# Patient Record
Sex: FEMALE | Race: BLACK OR AFRICAN AMERICAN | Employment: UNEMPLOYED | URBAN - METROPOLITAN AREA
[De-identification: names, ages, dates, MRNs, and addresses within clinical notes are randomized per-mention and may not be internally consistent; named-entity substitution may affect disease eponyms.]

---

## 2017-03-31 PROBLEM — M54.2 CERVICALGIA: Status: ACTIVE | Noted: 2017-03-31

## 2017-06-08 ENCOUNTER — ANESTHESIA EVENT (OUTPATIENT)
Dept: ENDOSCOPY | Age: 52
End: 2017-06-08
Payer: COMMERCIAL

## 2017-06-08 RX ORDER — SODIUM CHLORIDE, SODIUM LACTATE, POTASSIUM CHLORIDE, CALCIUM CHLORIDE 600; 310; 30; 20 MG/100ML; MG/100ML; MG/100ML; MG/100ML
100 INJECTION, SOLUTION INTRAVENOUS CONTINUOUS
Status: CANCELLED | OUTPATIENT
Start: 2017-06-08

## 2017-06-08 RX ORDER — SODIUM CHLORIDE 0.9 % (FLUSH) 0.9 %
5-10 SYRINGE (ML) INJECTION AS NEEDED
Status: CANCELLED | OUTPATIENT
Start: 2017-06-08

## 2017-06-09 ENCOUNTER — ANESTHESIA (OUTPATIENT)
Dept: ENDOSCOPY | Age: 52
End: 2017-06-09
Payer: COMMERCIAL

## 2017-06-09 ENCOUNTER — HOSPITAL ENCOUNTER (OUTPATIENT)
Age: 52
Setting detail: OUTPATIENT SURGERY
Discharge: HOME | End: 2017-06-09
Attending: SURGERY | Admitting: SURGERY
Payer: COMMERCIAL

## 2017-06-09 VITALS
HEART RATE: 70 BPM | SYSTOLIC BLOOD PRESSURE: 141 MMHG | TEMPERATURE: 96.8 F | HEIGHT: 65 IN | DIASTOLIC BLOOD PRESSURE: 77 MMHG | RESPIRATION RATE: 18 BRPM | WEIGHT: 196 LBS | BODY MASS INDEX: 32.65 KG/M2 | OXYGEN SATURATION: 66 %

## 2017-06-09 LAB — GLUCOSE BLD STRIP.AUTO-MCNC: 115 MG/DL (ref 65–100)

## 2017-06-09 PROCEDURE — 77030011640 HC PAD GRND REM COVD -A: Performed by: SURGERY

## 2017-06-09 PROCEDURE — 76040000025: Performed by: SURGERY

## 2017-06-09 PROCEDURE — 76060000032 HC ANESTHESIA 0.5 TO 1 HR: Performed by: SURGERY

## 2017-06-09 PROCEDURE — 77030009426 HC FCPS BIOP ENDOSC BSC -B: Performed by: SURGERY

## 2017-06-09 PROCEDURE — 74011000250 HC RX REV CODE- 250: Performed by: ANESTHESIOLOGY

## 2017-06-09 PROCEDURE — 82962 GLUCOSE BLOOD TEST: CPT

## 2017-06-09 PROCEDURE — 74011250636 HC RX REV CODE- 250/636

## 2017-06-09 PROCEDURE — 88305 TISSUE EXAM BY PATHOLOGIST: CPT | Performed by: SURGERY

## 2017-06-09 PROCEDURE — 74011250636 HC RX REV CODE- 250/636: Performed by: ANESTHESIOLOGY

## 2017-06-09 RX ORDER — SODIUM CHLORIDE, SODIUM LACTATE, POTASSIUM CHLORIDE, CALCIUM CHLORIDE 600; 310; 30; 20 MG/100ML; MG/100ML; MG/100ML; MG/100ML
100 INJECTION, SOLUTION INTRAVENOUS CONTINUOUS
Status: DISCONTINUED | OUTPATIENT
Start: 2017-06-09 | End: 2017-06-09 | Stop reason: HOSPADM

## 2017-06-09 RX ORDER — PROPOFOL 10 MG/ML
INJECTION, EMULSION INTRAVENOUS
Status: DISCONTINUED | OUTPATIENT
Start: 2017-06-09 | End: 2017-06-09 | Stop reason: HOSPADM

## 2017-06-09 RX ORDER — PROPOFOL 10 MG/ML
INJECTION, EMULSION INTRAVENOUS AS NEEDED
Status: DISCONTINUED | OUTPATIENT
Start: 2017-06-09 | End: 2017-06-09 | Stop reason: HOSPADM

## 2017-06-09 RX ADMIN — SODIUM CHLORIDE, SODIUM LACTATE, POTASSIUM CHLORIDE, AND CALCIUM CHLORIDE 100 ML/HR: 600; 310; 30; 20 INJECTION, SOLUTION INTRAVENOUS at 10:14

## 2017-06-09 RX ADMIN — FAMOTIDINE 20 MG: 10 INJECTION, SOLUTION INTRAVENOUS at 10:28

## 2017-06-09 RX ADMIN — PROPOFOL 200 MCG/KG/MIN: 10 INJECTION, EMULSION INTRAVENOUS at 12:04

## 2017-06-09 RX ADMIN — PROPOFOL 50 MG: 10 INJECTION, EMULSION INTRAVENOUS at 12:04

## 2017-06-09 RX ADMIN — PROPOFOL 30 MG: 10 INJECTION, EMULSION INTRAVENOUS at 12:05

## 2017-06-09 NOTE — DISCHARGE INSTRUCTIONS
Gastrointestinal Colonoscopy/Flexible Sigmoidoscopy - Lower Exam Discharge Instructions  1. Call Dr. Edson Ramirez   for any problems or questions. 2. Contact the doctors office for follow up appointment as directed  3. Medication may cause drowsiness for several hours, therefore, do not drive or operate machinery for remainder of the day. 4. No alcohol today. 5. Ordinarily, you may resume regular diet and activity after exam unless otherwise specified by your physician. 6. Because of air put into your colon during exam, you may experience some abdominal distension, relieved by the passage of gas, for several hours. 7. Contact your physician if you have any of the following:  a. Excessive amount of bleeding - large amount when having a bowel movement. Occasional specks of blood with bowel movement would not be unusual.  b. Severe abdominal pain  c. Fever or Chills  8. Polyp Removal - follow these additional instructions  a. No Aspirin, Advil, Aleve, Nuprin, Ibuprofen, or medications that contain these drugs for 2 weeks. Any additional instructions: Follow up with Dr Edson Ramirez to discuss polyp pathology. Instructions given to Martniez Atkins and other family members. Diverticulosis: Care Instructions  Your Care Instructions  In diverticulosis, pouches called diverticula form in the wall of the large intestine (colon). The pouches do not cause any pain or other symptoms. Most people who have diverticulosis do not know they have it. But the pouches sometimes bleed, and if they become infected, they can cause pain and other symptoms. When this happens, it is called diverticulitis. Diverticula form when pressure pushes the wall of the colon outward at certain weak points. A diet that is too low in fiber can cause diverticula. Follow-up care is a key part of your treatment and safety. Be sure to make and go to all appointments, and call your doctor if you are having problems.  It's also a good idea to know your test results and keep a list of the medicines you take. How can you care for yourself at home? Include fruits, leafy green vegetables, beans, and whole grains in your diet each day. These foods are high in fiber. Take a fiber supplement, such as Citrucel or Metamucil, every day if needed. Read and follow all instructions on the label. Drink plenty of fluids, enough so that your urine is light yellow or clear like water. If you have kidney, heart, or liver disease and have to limit fluids, talk with your doctor before you increase the amount of fluids you drink. Get at least 30 minutes of exercise on most days of the week. Walking is a good choice. You also may want to do other activities, such as running, swimming, cycling, or playing tennis or team sports. Cut out foods that cause gas, pain, or other symptoms. When should you call for help? Call your doctor now or seek immediate medical care if:  You have belly pain. You pass maroon or very bloody stools. You have a fever. You have nausea and vomiting. You have unusual changes in your bowel movements or abdominal swelling. You have burning pain when you urinate. You have abnormal vaginal discharge. You have shoulder pain. You have cramping pain that does not get better when you have a bowel movement or pass gas. You pass gas or stool from your urethra while urinating. Watch closely for changes in your health, and be sure to contact your doctor if you have any problems. Where can you learn more? Go to http://momo-tanvi.info/. Enter N560 in the search box to learn more about \"Diverticulosis: Care Instructions. \"  Current as of: August 9, 2016  Content Version: 11.2  © 2919-7083 [employer]. Care instructions adapted under license by [employer] (which disclaims liability or warranty for this information).  If you have questions about a medical condition or this instruction, always ask your healthcare professional. [address] any warranty or liability for your use of this information.

## 2017-06-09 NOTE — H&P
Colonoscopy History and Physical      Patient: Beba Rossi    Physician: Durga Marcelino MD    Referring Physician: No ref. provider found    Chief Complaint: For colonoscopy    History of Present Illness: Pt presents for colonoscopy. Initial study. History:  Past Medical History:   Diagnosis Date    Chronic pain     chronic back pain    DDD (degenerative disc disease)     Diabetes (HCC)     typell avgfbs 100-200, last A1C was in the 7\"s, recognizes lows in the 66's    GERD (gastroesophageal reflux disease)     controlled by protonix    Gout     Headache     History of sciatica     Hypertension     patient denies and is not treated for this    Low back pain     Tobacco abuse      Past Surgical History:   Procedure Laterality Date    HX GYN      hysterectomy    HX GYN          HX LAP CHOLECYSTECTOMY        Social History     Social History    Marital status:      Spouse name: N/A    Number of children: N/A    Years of education: N/A     Social History Main Topics    Smoking status: Current Every Day Smoker     Packs/day: 0.25     Years: 10.00     Types: Cigarettes    Smokeless tobacco: Never Used    Alcohol use No    Drug use: No    Sexual activity: Not Asked     Other Topics Concern    None     Social History Narrative      Family History   Problem Relation Age of Onset    Hypertension Mother     Diabetes Mother     Hypertension Father     Hypertension Sister     Cancer Sister      breast    Hypertension Brother        Medications:   Prior to Admission medications    Medication Sig Start Date End Date Taking? Authorizing Provider   HYDROcodone-acetaminophen (NORCO)  mg tablet TK 1 T PO Q 8 H PRF MODERATE PAIN 17   Historical Provider   nystatin (MYCOSTATIN) topical cream Apply  to affected area two (2) times daily as needed for Skin Irritation.  3/31/17   Yael Springer NP   cyclobenzaprine (FLEXERIL) 10 mg tablet Take 1 Tab by mouth two (2) times daily as needed for Muscle Spasm(s). 3/23/17   Ronnie Miller NP   pravastatin (PRAVACHOL) 40 mg tablet Take 1 Tab by mouth nightly. 3/23/17   Ronnie Miller NP   canagliflozin [facility]) 300 mg tablet Take  by mouth Daily (before breakfast). Historical Provider   pantoprazole (PROTONIX) 40 mg tablet Take 1 Tab by mouth daily. Patient taking differently: Take 40 mg by mouth every morning. 2/28/17   Ronnie Miller NP   SUMAtriptan (IMITREX) 6 mg/0.5 mL injection 6 mg by SubCUTAneous route once. Historical Provider       Allergies: Allergies   Allergen Reactions    Metformin Itching       Physical Exam:     Vital Signs:   Visit Vitals    /78    Pulse 68    Temp 98.1 °F (36.7 °C)    Resp 16    Ht 5' 5\" (1.651 m)    Wt 196 lb (88.9 kg)    LMP Comment: refusal    SpO2 99%    Breastfeeding No    BMI 32.62 kg/m2     . General: in NAD      Heart: regular   Lungs: unlabored   Abdominal: soft   Neurological: grossly normal        Findings/Diagnosis: Screening for colorectal cancer     Plan of Care/Planned Procedure: Colonoscopy. Risks of endoscopy include  bleeding, perforation. They understand and agree to proceed.       Signed:  Latrice Anderson MD   6/9/2017

## 2017-06-09 NOTE — ANESTHESIA POSTPROCEDURE EVALUATION
Post-Anesthesia Evaluation and Assessment    Patient: Mark Martinez MRN: [id number]  SSN: [id number]    YOB: 1965  Age: 45 y.o. Sex: female       Cardiovascular Function/Vital Signs  Visit Vitals    /77    Pulse 70    Temp 36 °C (96.8 °F)    Resp 18    Ht 5' 5\" (1.651 m)    Wt 88.9 kg (196 lb)    SpO2 (!) 66%    Breastfeeding No    BMI 32.62 kg/m2       Patient is status post total IV anesthesia anesthesia for Procedure(s):  COLONOSCOPY  BMI 32  ENDOSCOPIC POLYPECTOMY. Nausea/Vomiting: None    Postoperative hydration reviewed and adequate. Pain:  Pain Scale 1: Visual (06/09/17 1258)  Pain Intensity 1: 0 (06/09/17 1258)   Managed    Neurological Status: At baseline    Mental Status and Level of Consciousness: Arousable    Pulmonary Status:   O2 Device: Room air (06/09/17 1258)   Adequate oxygenation and airway patent    Complications related to anesthesia: None    Post-anesthesia assessment completed.  No concerns    Signed By: Saw Molina MD     June 9, 2017

## 2017-06-09 NOTE — ANESTHESIA PREPROCEDURE EVALUATION
Anesthetic History   No history of anesthetic complications            Review of Systems / Medical History  Patient summary reviewed and pertinent labs reviewed    Pulmonary          Smoker         Neuro/Psych   Within defined limits           Cardiovascular    Hypertension (Listed in chart but patient denies.  Not on any anti-hypertensives.)              Exercise tolerance: >4 METS     GI/Hepatic/Renal     GERD: well controlled           Endo/Other    Diabetes: well controlled, type 2    Arthritis     Other Findings   Comments: Chronic LBP  DDD           Physical Exam    Airway  Mallampati: I  TM Distance: > 6 cm  Neck ROM: normal range of motion   Mouth opening: Normal     Cardiovascular  Regular rate and rhythm,  S1 and S2 normal,  no murmur, click, rub, or gallop             Dental    Dentition: Upper partial plate     Pulmonary  Breath sounds clear to auscultation               Abdominal  GI exam deferred       Other Findings            Anesthetic Plan    ASA: 2  Anesthesia type: total IV anesthesia          Induction: Intravenous  Anesthetic plan and risks discussed with: Patient and Spouse

## 2017-06-09 NOTE — IP AVS SNAPSHOT
[address] 
[phone] Patient: Slava Woodruff MRN: [id number] :1965 You are allergic to the following Allergen Reactions Metformin Itching Recent Documentation Height Weight Breastfeeding? BMI OB Status Smoking Status 1.651 m 88.9 kg No 32.62 kg/m2 Hysterectomy Current Every Day Smoker Emergency Contacts Name Discharge Info Relation Home Work Mobile Corey Melo  Spouse [3] [phone] About your hospitalization You were admitted on:  2017 You last received care in the:  [facility] You were discharged on:  2017 Unit phone number:  [phone] Why you were hospitalized Your primary diagnosis was:  Not on File Providers Seen During Your Hospitalizations Provider Role Specialty Primary office phone Ekaterina Arana MD Attending Provider General Surgery [phone] Your Primary Care Physician (PCP) Primary Care Physician Office Phone Office Fax Gabi Duval 66 [phone] Follow-up Information None Your Appointments 2017  9:15 AM EDT  
ESTABLISHED PATIENT with Sanford Luna NP  
[facility] ([address] [place] [address]  
[phone] Current Discharge Medication List  
  
ASK your doctor about these medications Dose & Instructions Dispensing Information Comments Morning Noon Evening Bedtime  
 cyclobenzaprine 10 mg tablet Commonly known as:  FLEXERIL Your last dose was: Your next dose is:    
   
   
 Dose:  10 mg Take 1 Tab by mouth two (2) times daily as needed for Muscle Spasm(s). Quantity:  40 Tab Refills:  2 HYDROcodone-acetaminophen  mg tablet Commonly known as:  Deepika Garnica  
   
 Your last dose was: Your next dose is:    
   
   
 TK 1 T PO Q 8 H PRF MODERATE PAIN Refills:  0 INVOKANA 300 mg tablet Generic drug:  canagliflozin Your last dose was: Your next dose is: Take  by mouth Daily (before breakfast). Refills:  0  
     
   
   
   
  
 nystatin topical cream  
Commonly known as:  MYCOSTATIN Your last dose was: Your next dose is:    
   
   
 Apply  to affected area two (2) times daily as needed for Skin Irritation. Quantity:  30 g Refills:  2  
     
   
   
   
  
 pantoprazole 40 mg tablet Commonly known as:  PROTONIX Your last dose was: Your next dose is:    
   
   
 Dose:  40 mg Take 1 Tab by mouth daily. Quantity:  30 Tab Refills:  3  
     
   
   
   
  
 pravastatin 40 mg tablet Commonly known as:  PRAVACHOL Your last dose was: Your next dose is:    
   
   
 Dose:  40 mg Take 1 Tab by mouth nightly. Quantity:  30 Tab Refills:  5 SUMAtriptan 6 mg/0.5 mL injection Commonly known as:  IMITREX Your last dose was: Your next dose is:    
   
   
 Dose:  6 mg  
6 mg by SubCUTAneous route once. Refills:  0 Discharge Instructions Gastrointestinal Colonoscopy/Flexible Sigmoidoscopy - Lower Exam Discharge Instructions 1. Call Dr. Jasbir Gibbs   for any problems or questions. 2. Contact the doctors office for follow up appointment as directed 3. Medication may cause drowsiness for several hours, therefore, do not drive or operate machinery for remainder of the day. 4. No alcohol today. 5. Ordinarily, you may resume regular diet and activity after exam unless otherwise specified by your physician. 6. Because of air put into your colon during exam, you may experience some abdominal distension, relieved by the passage of gas, for several hours. 7. Contact your physician if you have any of the following: 
a. Excessive amount of bleeding  large amount when having a bowel movement. Occasional specks of blood with bowel movement would not be unusual. 
b. Severe abdominal pain 
c. Fever or Chills 8. Polyp Removal  follow these additional instructions 
a. No Aspirin, Advil, Aleve, Nuprin, Ibuprofen, or medications that contain these drugs for 2 weeks. Any additional instructions: Follow up with Dr Gene Munson to discuss polyp pathology. Instructions given to Jarrett Lockett and other family members. Diverticulosis: Care Instructions Your Care Instructions In diverticulosis, pouches called diverticula form in the wall of the large intestine (colon). The pouches do not cause any pain or other symptoms. Most people who have diverticulosis do not know they have it. But the pouches sometimes bleed, and if they become infected, they can cause pain and other symptoms. When this happens, it is called diverticulitis. Diverticula form when pressure pushes the wall of the colon outward at certain weak points. A diet that is too low in fiber can cause diverticula. Follow-up care is a key part of your treatment and safety. Be sure to make and go to all appointments, and call your doctor if you are having problems. It's also a good idea to know your test results and keep a list of the medicines you take. How can you care for yourself at home? Include fruits, leafy green vegetables, beans, and whole grains in your diet each day. These foods are high in fiber. Take a fiber supplement, such as Citrucel or Metamucil, every day if needed. Read and follow all instructions on the label. Drink plenty of fluids, enough so that your urine is light yellow or clear like water. If you have kidney, heart, or liver disease and have to limit fluids, talk with your doctor before you increase the amount of fluids you drink. Get at least 30 minutes of exercise on most days of the week. Walking is a good choice. You also may want to do other activities, such as running, swimming, cycling, or playing tennis or team sports. Cut out foods that cause gas, pain, or other symptoms. When should you call for help? Call your doctor now or seek immediate medical care if: 
You have belly pain. You pass maroon or very bloody stools. You have a fever. You have nausea and vomiting. You have unusual changes in your bowel movements or abdominal swelling. You have burning pain when you urinate. You have abnormal vaginal discharge. You have shoulder pain. You have cramping pain that does not get better when you have a bowel movement or pass gas. You pass gas or stool from your urethra while urinating. Watch closely for changes in your health, and be sure to contact your doctor if you have any problems. Where can you learn more? Go to http://momo-tanvi.info/. Enter S437 in the search box to learn more about \"Diverticulosis: Care Instructions. \" Current as of: August 9, 2016 Content Version: 11.2 © 4485-8640 [employer]. Care instructions adapted under license by [employer] (which disclaims liability or warranty for this information). If you have questions about a medical condition or this instruction, always ask your healthcare professional. [address] any warranty or liability for your use of this information. Discharge Orders None Introducing [facility]! Jesus Cedillo introduces [employer] patient portal. Now you can access parts of your medical record, email your doctor's office, and request medication refills online. 1. In your internet browser, go to [email or web address] 2. Click on the First Time User? Click Here link in the Sign In box. You will see the New Member Sign Up page. 3. Enter your Beezag Access Code exactly as it appears below. You will not need to use this code after youve completed the sign-up process. If you do not sign up before the expiration date, you must request a new code. · Beezag Access Code: [email or web address] Expires: 9/5/2017  9:24 AM 
 
4. Enter the last four digits of your Social Security Number (xxxx) and Date of Birth (mm/dd/yyyy) as indicated and click Submit. You will be taken to the next sign-up page. 5. Create a Beezag ID. This will be your Beezag login ID and cannot be changed, so think of one that is secure and easy to remember. 6. Create a Beezag password. You can change your password at any time. 7. Enter your Password Reset Question and Answer. This can be used at a later time if you forget your password. 8. Enter your e-mail address. You will receive e-mail notification when new information is available in [address]. 9. Click Sign Up. You can now view and download portions of your medical record. 10. Click the Download Summary menu link to download a portable copy of your medical information. If you have questions, please visit the Frequently Asked Questions section of the Beezag website. Remember, Beezag is NOT to be used for urgent needs. For medical emergencies, dial 911. Now available from your iPhone and Android! General Information Please provide this summary of care documentation to your next provider. Patient Signature:  ____________________________________________________________ Date:  ____________________________________________________________  
  
Baron Tran Provider Signature:  ____________________________________________________________ Date:  ____________________________________________________________

## 2017-06-09 NOTE — PROCEDURES
Procedure in Detail:  Informed consent was obtained for the procedure. The patient was placed in the left lateral decubitus position and sedation was induced by anesthesia. The [id number] was inserted into the rectum and advanced under direct vision to the cecum, which was identified by the ileocecal valve and appendiceal orifice. The quality of the colonic preparation was good. A careful inspection was made as the colonoscope was withdrawn, including a retroflexed view of the rectum; findings and interventions are described below. Appropriate photodocumentation was obtained. Findings:   Rectum:   Normal  Sigmoid:     - Excavated lesions:     - Diverticulosis    - Protruding lesions:     -Sessile Polyp(s) size 3 and 4 mm removed by polypectomy (hot biopsy)  Descending Colon:   Normal  Transverse Colon:   Normal  Ascending Colon:   Normal  Cecum:   Normal          Specimens: Specimens were collected and sent to pathology. Complications: None; patient tolerated the procedure well. \    EBL - none    Recommendations:   - Await pathology. - If adenoma is present, repeat colonoscopy in 5 years.      Signed By: Tammi Rossi MD                        June 9, 2017

## 2017-06-09 NOTE — ROUTINE PROCESS
Patient to lobby via w/c. Education and discharge information provided to patient and  Rebelpriya Juana.

## 2017-12-21 ENCOUNTER — HOSPITAL ENCOUNTER (EMERGENCY)
Age: 52
Discharge: HOME | End: 2017-12-22
Attending: EMERGENCY MEDICINE
Payer: COMMERCIAL

## 2017-12-21 ENCOUNTER — APPOINTMENT (OUTPATIENT)
Dept: CT IMAGING | Age: 52
End: 2017-12-21
Attending: EMERGENCY MEDICINE
Payer: COMMERCIAL

## 2017-12-21 DIAGNOSIS — R51.9 NONINTRACTABLE HEADACHE, UNSPECIFIED CHRONICITY PATTERN, UNSPECIFIED HEADACHE TYPE: Primary | ICD-10-CM

## 2017-12-21 PROCEDURE — 99283 EMERGENCY DEPT VISIT LOW MDM: CPT | Performed by: EMERGENCY MEDICINE

## 2017-12-21 PROCEDURE — 70450 CT HEAD/BRAIN W/O DYE: CPT

## 2017-12-21 PROCEDURE — 96374 THER/PROPH/DIAG INJ IV PUSH: CPT | Performed by: EMERGENCY MEDICINE

## 2017-12-21 PROCEDURE — 96375 TX/PRO/DX INJ NEW DRUG ADDON: CPT | Performed by: EMERGENCY MEDICINE

## 2017-12-21 PROCEDURE — 74011250636 HC RX REV CODE- 250/636: Performed by: EMERGENCY MEDICINE

## 2017-12-21 PROCEDURE — 96361 HYDRATE IV INFUSION ADD-ON: CPT | Performed by: EMERGENCY MEDICINE

## 2017-12-21 RX ORDER — SODIUM CHLORIDE 0.9 % (FLUSH) 0.9 %
5-10 SYRINGE (ML) INJECTION EVERY 8 HOURS
Status: DISCONTINUED | OUTPATIENT
Start: 2017-12-21 | End: 2017-12-22 | Stop reason: HOSPADM

## 2017-12-21 RX ORDER — METOCLOPRAMIDE HYDROCHLORIDE 5 MG/ML
10 INJECTION INTRAMUSCULAR; INTRAVENOUS
Status: COMPLETED | OUTPATIENT
Start: 2017-12-21 | End: 2017-12-21

## 2017-12-21 RX ORDER — SODIUM CHLORIDE 0.9 % (FLUSH) 0.9 %
5-10 SYRINGE (ML) INJECTION AS NEEDED
Status: DISCONTINUED | OUTPATIENT
Start: 2017-12-21 | End: 2017-12-22 | Stop reason: HOSPADM

## 2017-12-21 RX ORDER — DIPHENHYDRAMINE HYDROCHLORIDE 50 MG/ML
25 INJECTION, SOLUTION INTRAMUSCULAR; INTRAVENOUS
Status: COMPLETED | OUTPATIENT
Start: 2017-12-21 | End: 2017-12-21

## 2017-12-21 RX ADMIN — METOCLOPRAMIDE 10 MG: 5 INJECTION, SOLUTION INTRAMUSCULAR; INTRAVENOUS at 23:47

## 2017-12-21 RX ADMIN — DIPHENHYDRAMINE HYDROCHLORIDE 25 MG: 50 INJECTION, SOLUTION INTRAMUSCULAR; INTRAVENOUS at 23:45

## 2017-12-21 RX ADMIN — SODIUM CHLORIDE 1000 ML: 900 INJECTION, SOLUTION INTRAVENOUS at 23:48

## 2017-12-22 VITALS
HEART RATE: 82 BPM | SYSTOLIC BLOOD PRESSURE: 136 MMHG | WEIGHT: 202 LBS | OXYGEN SATURATION: 93 % | RESPIRATION RATE: 17 BRPM | HEIGHT: 65 IN | DIASTOLIC BLOOD PRESSURE: 82 MMHG | BODY MASS INDEX: 33.66 KG/M2 | TEMPERATURE: 98.5 F

## 2017-12-22 PROCEDURE — 74011250636 HC RX REV CODE- 250/636

## 2017-12-22 PROCEDURE — 96375 TX/PRO/DX INJ NEW DRUG ADDON: CPT | Performed by: EMERGENCY MEDICINE

## 2017-12-22 RX ORDER — SUMATRIPTAN 6 MG/.5ML
6 INJECTION, SOLUTION SUBCUTANEOUS ONCE
Qty: 0.5 ML | Refills: 0 | Status: SHIPPED
Start: 2017-12-22 | End: 2017-12-22

## 2017-12-22 RX ORDER — BUTALBITAL, ACETAMINOPHEN AND CAFFEINE 300; 40; 50 MG/1; MG/1; MG/1
1 CAPSULE ORAL
Qty: 12 CAP | Refills: 0 | Status: SHIPPED | OUTPATIENT
Start: 2017-12-22 | End: 2018-02-01

## 2017-12-22 RX ORDER — HYDROMORPHONE HCL IN 0.9% NACL 50 MG/50ML
1 PLASTIC BAG, INJECTION (ML) INJECTION
Status: COMPLETED | OUTPATIENT
Start: 2017-12-22 | End: 2017-12-22

## 2017-12-22 RX ORDER — HYDROMORPHONE HYDROCHLORIDE 2 MG/ML
1 INJECTION, SOLUTION INTRAMUSCULAR; INTRAVENOUS; SUBCUTANEOUS
Status: DISCONTINUED | OUTPATIENT
Start: 2017-12-22 | End: 2017-12-22

## 2017-12-22 RX ADMIN — Medication 1 MG: at 00:52

## 2017-12-22 NOTE — ED NOTES
Assumed care of patient at 11:15 PM patient had not received her medication yet but had her CAT scan completed. Radiologist reads a subtle hyperdense area that may be cavernoma versus small hemorrhage. Discussed with radiologist who did not feel an LP. If any benefit and MRI also not best choice of imaging. He suggested the patient have a repeat CT follow-up 24 hours from now (this may be artifact). Patient's pain was controlled there was no neurological deficits. (Radiologist emphasized there was no mass effect). Discussion with the patient and her . Patient used to have migraines was seen at [facility] for quite some time and was followed by neurologist and is taking Imitrex shots.  relates an injury that occurred several years ago and that there was a spot on her brain that they said was possible bleeding. He points to the right side of her head. This would correspond with the finding tonight. We will control her pain and have her follow-up with a neurologist this week.

## 2017-12-22 NOTE — ED PROVIDER NOTES
HPI Comments: 50-year-old lady with a history of a headache started around 11 AM yesterday morning. Patient says she has a history of migraines but those are normally near the front of her head and this headache is more towards the side and back of her head. The pain radiates from the side and goes down towards her neck. She says with the pain she has had no weakness, numbness, or tingling. Pain is gradually gotten worse and has not been a thunderclap headache. She does have a remote history of a traumatic head bleed is no known history of aneurysms. Patient was seen at the [facility] earlier today for the same concerns and at that time declined imaging. She did get better with some Benadryl and Reglan. Patient says after those medicines were offered headache started to come back so she wanted to get reevaluated. Overall she rates her pain as a 9 out of 10. Elements of this note were made using speech recognition software. As such, there may be errors of speech recognition present. Patient is a 47 y.o. female presenting with headaches. The history is provided by the patient. Headache    Pertinent negatives include no fever, no palpitations, no shortness of breath, no weakness, no dizziness, no nausea and no vomiting.         Past Medical History:   Diagnosis Date    Chronic pain     chronic back pain    DDD (degenerative disc disease)     Diabetes (HCC)     typell avgfbs 100-200, last A1C was in the 7\"s, recognizes lows in the 66's    GERD (gastroesophageal reflux disease)     controlled by protonix    Gout     Headache(784.0)     History of sciatica     Hypertension     patient denies and is not treated for this    Low back pain     Tobacco abuse        Past Surgical History:   Procedure Laterality Date    COLONOSCOPY N/A 2017    COLONOSCOPY  BMI 32 performed by Brunilda Wing [facility] at [facility] ENDOSCOPY    HX GYN      hysterectomy    HX GYN          HX LAP CHOLECYSTECTOMY      PA COLSC FLX W/REMOVAL LESION BY HOT BX FORCEPS  6/9/2017              Family History:   Problem Relation Age of Onset    Hypertension Mother     Diabetes Mother     Hypertension Father     Hypertension Sister     Cancer Sister      breast    Hypertension Brother        Social History     Social History    Marital status:      Spouse name: N/A    Number of children: N/A    Years of education: N/A     Occupational History    Not on file. Social History Main Topics    Smoking status: Former Smoker     Packs/day: 0.25     Years: 14.00     Types: Cigarettes    Smokeless tobacco: Never Used      Comment: just quit on chantix    Alcohol use No    Drug use: No    Sexual activity: Not on file     Other Topics Concern    Not on file     Social History Narrative         ALLERGIES: Metformin    Review of Systems   Constitutional: Negative for chills, diaphoresis and fever. HENT: Negative for congestion, rhinorrhea and sore throat. Eyes: Negative for redness and visual disturbance. Respiratory: Negative for cough, chest tightness, shortness of breath and wheezing. Cardiovascular: Negative for chest pain and palpitations. Gastrointestinal: Negative for abdominal pain, blood in stool, diarrhea, nausea and vomiting. Endocrine: Negative for polydipsia and polyuria. Genitourinary: Negative for dysuria and hematuria. Musculoskeletal: Negative for arthralgias, myalgias and neck stiffness. Skin: Negative for rash. Allergic/Immunologic: Negative for environmental allergies and food allergies. Neurological: Positive for headaches. Negative for dizziness and weakness. Hematological: Negative for adenopathy. Does not bruise/bleed easily. Psychiatric/Behavioral: Negative for confusion and sleep disturbance. The patient is not nervous/anxious.         Vitals:    12/21/17 2147   BP: 166/89   Pulse: 84   Resp: 16   Temp: 98.5 °F (36.9 °C)   SpO2: 93%   Weight: 91.6 kg (202 lb)   Height: 5' 5\" (1.651 m)            Physical Exam   Constitutional: She is oriented to person, place, and time. She appears well-developed and well-nourished. HENT:   Head: Normocephalic and atraumatic. Eyes: Conjunctivae and EOM are normal. Pupils are equal, round, and reactive to light. Neck: Normal range of motion. Cardiovascular: Normal rate and regular rhythm. Pulmonary/Chest: Effort normal and breath sounds normal. No respiratory distress. She has no wheezes. She has no rales. She exhibits no tenderness. Abdominal: Soft. Bowel sounds are normal. There is no rebound and no guarding. Musculoskeletal: Normal range of motion. She exhibits no edema or tenderness. Lymphadenopathy:     She has no cervical adenopathy. Neurological: She is alert and oriented to person, place, and time. She has normal reflexes. Coordination normal.   No meningismus   Skin: Skin is warm and dry. Psychiatric: She has a normal mood and affect. Nursing note and vitals reviewed. MDM  Number of Diagnoses or Management Options  Diagnosis management comments: Given the Vicodin since her second visit in 24 hours I will get a head CT scan to rule out an occult hypertensive bleed. Her symptoms are not suggestive of a subarachnoid hemorrhage. I will treat again with Benadryl and Reglan.     ED Course       Procedures

## 2017-12-22 NOTE — ED NOTES
I have reviewed discharge instructions with the patient. The patient verbalized understanding. Patient left ED via Discharge Method: ambulatory to Home with (). Opportunity for questions and clarification provided. Patient given 0 scripts. To continue your aftercare when you leave the hospital, you may receive an automated call from our care team to check in on how you are doing. This is a free service and part of our promise to provide the best care and service to meet your aftercare needs.  If you have questions, or wish to unsubscribe from this service please call [phone]. Thank you for Choosing our [facility] Emergency Department.

## 2018-09-24 ENCOUNTER — HOSPITAL ENCOUNTER (EMERGENCY)
Age: 53
Discharge: HOME | End: 2018-09-24
Attending: EMERGENCY MEDICINE
Payer: COMMERCIAL

## 2018-09-24 ENCOUNTER — APPOINTMENT (OUTPATIENT)
Dept: CT IMAGING | Age: 53
End: 2018-09-24
Attending: EMERGENCY MEDICINE
Payer: COMMERCIAL

## 2018-09-24 VITALS
HEART RATE: 76 BPM | BODY MASS INDEX: 31.65 KG/M2 | DIASTOLIC BLOOD PRESSURE: 74 MMHG | TEMPERATURE: 97.6 F | SYSTOLIC BLOOD PRESSURE: 165 MMHG | OXYGEN SATURATION: 99 % | HEIGHT: 65 IN | WEIGHT: 190 LBS | RESPIRATION RATE: 16 BRPM

## 2018-09-24 DIAGNOSIS — M54.50 ACUTE LEFT-SIDED LOW BACK PAIN WITHOUT SCIATICA: Primary | ICD-10-CM

## 2018-09-24 DIAGNOSIS — R07.9 CHEST PAIN, UNSPECIFIED TYPE: ICD-10-CM

## 2018-09-24 LAB
ANION GAP SERPL CALC-SCNC: 8 MMOL/L
ATRIAL RATE: 77 BPM
BUN SERPL-MCNC: 15 MG/DL (ref 6–23)
CALCIUM SERPL-MCNC: 9.5 MG/DL (ref 8.3–10.4)
CALCULATED P AXIS, ECG09: 55 DEGREES
CALCULATED R AXIS, ECG10: 70 DEGREES
CALCULATED T AXIS, ECG11: 43 DEGREES
CHLORIDE SERPL-SCNC: 107 MMOL/L (ref 98–107)
CO2 SERPL-SCNC: 26 MMOL/L (ref 21–32)
CREAT SERPL-MCNC: 0.8 MG/DL (ref 0.6–1)
DIAGNOSIS, 93000: NORMAL
ERYTHROCYTE [DISTWIDTH] IN BLOOD BY AUTOMATED COUNT: 13.5 %
GLUCOSE SERPL-MCNC: 143 MG/DL (ref 65–100)
HCT VFR BLD AUTO: 45 % (ref 35.8–46.3)
HGB BLD-MCNC: 14.6 G/DL (ref 11.7–15.4)
MCH RBC QN AUTO: 31.3 PG (ref 26.1–32.9)
MCHC RBC AUTO-ENTMCNC: 32.4 G/DL (ref 31.4–35)
MCV RBC AUTO: 96.4 FL (ref 79.6–97.8)
NRBC # BLD: 0 K/UL (ref 0–0.2)
P-R INTERVAL, ECG05: 150 MS
PLATELET # BLD AUTO: 302 K/UL (ref 150–450)
PMV BLD AUTO: 10.4 FL (ref 9.4–12.3)
POTASSIUM SERPL-SCNC: 3.8 MMOL/L (ref 3.5–5.1)
Q-T INTERVAL, ECG07: 384 MS
QRS DURATION, ECG06: 94 MS
QTC CALCULATION (BEZET), ECG08: 434 MS
RBC # BLD AUTO: 4.67 M/UL (ref 4.05–5.2)
SODIUM SERPL-SCNC: 141 MMOL/L (ref 136–145)
TROPONIN I BLD-MCNC: 0.02 NG/ML (ref 0.02–0.05)
VENTRICULAR RATE, ECG03: 77 BPM
WBC # BLD AUTO: 5.2 K/UL (ref 4.3–11.1)

## 2018-09-24 PROCEDURE — 93005 ELECTROCARDIOGRAM TRACING: CPT | Performed by: EMERGENCY MEDICINE

## 2018-09-24 PROCEDURE — 80048 BASIC METABOLIC PNL TOTAL CA: CPT

## 2018-09-24 PROCEDURE — 99284 EMERGENCY DEPT VISIT MOD MDM: CPT | Performed by: EMERGENCY MEDICINE

## 2018-09-24 PROCEDURE — 85027 COMPLETE CBC AUTOMATED: CPT

## 2018-09-24 PROCEDURE — 74176 CT ABD & PELVIS W/O CONTRAST: CPT

## 2018-09-24 PROCEDURE — 81003 URINALYSIS AUTO W/O SCOPE: CPT | Performed by: EMERGENCY MEDICINE

## 2018-09-24 PROCEDURE — 84484 ASSAY OF TROPONIN QUANT: CPT

## 2018-09-24 RX ORDER — CYCLOBENZAPRINE HCL 10 MG
10 TABLET ORAL 2 TIMES DAILY
Qty: 10 TAB | Refills: 0 | Status: SHIPPED | OUTPATIENT
Start: 2018-09-24 | End: 2019-01-28

## 2018-09-24 RX ORDER — HYDROCODONE BITARTRATE AND ACETAMINOPHEN 5; 325 MG/1; MG/1
1 TABLET ORAL
Qty: 10 TAB | Refills: 0 | Status: SHIPPED | OUTPATIENT
Start: 2018-09-24

## 2018-09-24 NOTE — ED PROVIDER NOTES
HPI: 
48-year-old female recent heart catheter at [facility] is still complaining of back pain. Heart catheter on Thursday. Stay until Friday. Saturday started having lower back pain bilaterally. Denies any saddle paresthesia weakness tingling or numbness. Still has pain over the right groin. No bleeding. Also still has intermittent chest discomfort. She has not taken any nitroglycerin for this. She is currently on Plavix. Denies any hemoptysis. No leg swelling. Atraumatic. Pain worsened with movements ROS Constitutional: No fever, no chills Skin: no rash Eye: No vision changes ENMT: No sore throat Respiratory: No shortness of breath, no cough Cardiovascular: + chest pain, no palpitations Gastrointestinal: No vomiting, no nausea, no diarrhea, no abdominal pain : No dysuria MSK: + back pain, no muscle pain, no joint pain Neuro: No headache, no change in mental status, no numbness, no tingling, no weakness Psych:  
Endocrine:  
All other review of systems positive per history of present illness and the above otherwise negative or noncontributory. Visit Vitals  /89 (BP 1 Location: Left arm, BP Patient Position: At rest)  Pulse 78  Temp 98.2 °F (36.8 °C)  Resp 17  Ht 5' 5\" (1.651 m)  Wt 86.2 kg (190 lb)  SpO2 100%  BMI 31.62 kg/m2 Past Medical History:  
Diagnosis Date  Chronic pain   
 chronic back pain  DDD (degenerative disc disease)  Diabetes ([facility] [address] typell avgfbs 100-200, last A1C was in the 7\"s, recognizes lows in the 70's  GERD (gastroesophageal reflux disease)   
 controlled by protonix  Gout  Headache(784.0)  History of sciatica  Hypertension   
 patient denies and is not treated for this  Low back pain  Tobacco abuse Past Surgical History:  
Procedure Laterality Date  COLONOSCOPY N/A 6/9/2017  COLONOSCOPY  BMI 32 performed by Jamie Merritt MD at [facility] GYN    
 hysterectomy  HX GYN    
   HX LAP CHOLECYSTECTOMY  AL COLSC FLX W/REMOVAL LESION BY HOT BX FORCEPS  2017 Prior to Admission Medications Prescriptions Last Dose Informant Patient Reported? Taking? HYDROcodone-acetaminophen (NORCO)  mg tablet   Yes No  
Sig: Take 1 Tab by mouth. diclofenac potassium (CATAFLAM) 50 mg tablet   No No  
Sig: Take 1 Tab by mouth two (2) times a day. Facility-Administered Medications: None Adult Exam  
General: alert, no acute distress Head: normocephalic, atraumatic ENT: moist mucous membranes Neck: supple, non-tender; full range of motion Cardiovascular: regular rate and rhythm, normal peripheral perfusion, no edema Respiratory:  normal respirations; no wheezing, rales or rhonchi Gastrointestinal: soft, non-tender; no rebound or guarding, no peritoneal signs, no distension Back: no midline cervical, thoracic, lumbar spine tenderness or steps-off. Bilateral paralumbar discomfort on palpation. Range of motion is Slightly decreased secondary to pain Straight leg raise Negative the pain worsened on left compared to right No saddle paresthesia. Sensation and bilateral lower extremities are intact. 2+ and Equal dorsalis pedis, femoral pulses. Bilateral patella and Achilles tendons 2/4 and equal. no spasticity Musculoskeletal: normal range of motion, normal strength, no gross deformities Neurological: alert and oriented x 4, no gross focal deficits; normal speech Psychiatric: cooperative; appropriate mood and affect MDM: EKG rate of 77 sinus rhythm normal axis and interval.  No STEMI, ischemic changes noted. No ectopy of Brugada Intermittent chest pain was obtained troponin, labs will obtain CT for atraumatic back pain and recent right groin heart catheter and on Plavix for retroperitoneal hemorrhage ED Course EKG normal. Trop negative. Abdomen Unremarkable. CT without retropharyngeal hemorrhage.   No bony lesion noted. Likely musculoskeletal back pain. Do not suspect cauda equina, and the specialist syndrome, epidural abscess. We'll discharge home at this time with Flexeril, a few tablets of pain medication for home. Return precautions given. No particular questions or concerns. Recommend follow-up with her cardiologist.  Recent heart catheter. Do not feel she needs to be admitted for repeat cardiac enzyme. No results found. Recent Results (from the past 24 hour(s)) EKG, 12 LEAD, INITIAL Collection Time: 09/24/18  2:51 PM  
Result Value Ref Range Ventricular Rate 77 BPM  
 Atrial Rate 77 BPM  
 P-R Interval 150 ms QRS Duration 94 ms Q-T Interval 384 ms QTC Calculation (Bezet) 434 ms Calculated P Axis 55 degrees Calculated R Axis 70 degrees Calculated T Axis 43 degrees Diagnosis Normal sinus rhythm Possible Left atrial enlargement Nonspecific T wave abnormality Abnormal ECG When compared with ECG of 02-JAN-2015 01:58, No significant change was found Ct Abd Pelv Wo Cont Result Date: 9/24/2018 CT of the abdomen and pelvis without contrast. CLINICAL INDICATION: Recent right heart catheterization with atraumatic back pain, on Plavix, concern for retroperitoneal hematoma PROCEDURE: Serial thin-section axial images obtained from the upper abdomen through the proximal femurs without the administration of intravenous or oral contrast.  Radiation dose reduction techniques were used for this study. Our CT scanners use one or all of the following: Automated exposure control, adjusted of the mA and/or kV according to patient size, iterative reconstruction COMPARISON: No prior FINDINGS:  Evaluation of the hollow and solid viscera is limited by the lack of intravenous contrast. The lung bases are clear. CT ABDOMEN: No hemoperitoneum or ascites. No retroperitoneal hematoma appreciated. No renal or ureteral calculi. Cholecystectomy clips are noted.  The bowel is grossly unremarkable. CT PELVIS: Diverticular changes are noted along the sigmoid colon. No free fluid accumulating dependently in the pelvis. The bladder is decompressed. Inflammatory changes are noted along the right common femoral vessels in keeping with recent groin catheterization procedure. No hematoma appreciated. No aggressive osseous lesion identified. IMPRESSION: 1. Mild inflammation along the right common femoral vessels in keeping with a recent catheterization procedure. No hematoma appreciated in the right groin region. 2. No retroperitoneal hematoma evident. Dragon voice recognition software was used to create this note. Although the note has been reviewed and corrected where necessary, additional errors may have been overlooked and remain in the text.

## 2018-09-24 NOTE — ED NOTES
I have reviewed discharge instructions with the patient. The patient verbalized understanding. Patient left ED via Discharge Method: ambulatory to Home with spouse. Opportunity for questions and clarification provided. Patient given 2 scripts. To continue your aftercare when you leave the hospital, you may receive an automated call from our care team to check in on how you are doing. This is a free service and part of our promise to provide the best care and service to meet your aftercare needs.  If you have questions, or wish to unsubscribe from this service please call [phone]. Thank you for Choosing our [facility] Emergency Department.

## 2018-09-24 NOTE — ED TRIAGE NOTES
Pt reports having stent placement last week. Denies chest pain now, but is having lower back pain since Friday morning

## 2019-02-04 ENCOUNTER — HOSPITAL ENCOUNTER (OUTPATIENT)
Dept: MAMMOGRAPHY | Age: 54
Discharge: HOME | End: 2019-02-04
Attending: NURSE PRACTITIONER
Payer: COMMERCIAL

## 2019-02-04 DIAGNOSIS — Z12.31 BREAST CANCER SCREENING BY MAMMOGRAM: ICD-10-CM

## 2019-02-04 PROBLEM — E78.2 MIXED HYPERLIPIDEMIA: Status: ACTIVE | Noted: 2018-10-18

## 2019-02-04 PROBLEM — E55.9 VITAMIN D DEFICIENCY: Status: ACTIVE | Noted: 2019-02-04

## 2019-02-04 PROBLEM — M46.1 SACROILIITIS (HCC): Status: ACTIVE | Noted: 2017-04-04

## 2019-02-04 PROBLEM — I25.10 CORONARY ARTERY DISEASE INVOLVING NATIVE CORONARY ARTERY OF NATIVE HEART: Status: ACTIVE | Noted: 2018-09-21

## 2019-02-04 PROCEDURE — 77067 SCR MAMMO BI INCL CAD: CPT

## 2019-02-13 NOTE — PROGRESS NOTES
Please let patient know we are aware of her normal mammogram and she can follow up in one year for next one.  Joy Gooden NP

## 2022-03-18 PROBLEM — M46.1 SACROILIITIS: Status: ACTIVE | Noted: 2017-04-04

## 2022-03-18 PROBLEM — M54.2 CERVICALGIA: Status: ACTIVE | Noted: 2017-03-31

## 2022-03-18 PROBLEM — E78.2 MIXED HYPERLIPIDEMIA: Status: ACTIVE | Noted: 2018-10-18

## 2022-03-20 PROBLEM — E55.9 VITAMIN D DEFICIENCY: Status: ACTIVE | Noted: 2019-02-04

## 2022-03-20 PROBLEM — I25.10 CORONARY ARTERY DISEASE INVOLVING NATIVE CORONARY ARTERY OF NATIVE HEART: Status: ACTIVE | Noted: 2018-09-21
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
## 2025-04-11 ENCOUNTER — OFFICE VISIT (OUTPATIENT)
Age: 60
End: 2025-04-11
Payer: MEDICARE

## 2025-04-11 DIAGNOSIS — S81.801S LEG WOUND, RIGHT, SEQUELA: ICD-10-CM

## 2025-04-11 DIAGNOSIS — G89.29 CHRONIC SI JOINT PAIN: ICD-10-CM

## 2025-04-11 DIAGNOSIS — G89.29 CHRONIC NECK PAIN: ICD-10-CM

## 2025-04-11 DIAGNOSIS — M54.2 CHRONIC NECK PAIN: ICD-10-CM

## 2025-04-11 DIAGNOSIS — M54.41 CHRONIC BILATERAL LOW BACK PAIN WITH RIGHT-SIDED SCIATICA: ICD-10-CM

## 2025-04-11 DIAGNOSIS — M70.61 GREATER TROCHANTERIC BURSITIS OF RIGHT HIP: Primary | ICD-10-CM

## 2025-04-11 DIAGNOSIS — M47.816 FACET ARTHROPATHY, LUMBAR: ICD-10-CM

## 2025-04-11 DIAGNOSIS — G89.29 CHRONIC BILATERAL LOW BACK PAIN WITH RIGHT-SIDED SCIATICA: ICD-10-CM

## 2025-04-11 DIAGNOSIS — M53.3 CHRONIC SI JOINT PAIN: ICD-10-CM

## 2025-04-11 DIAGNOSIS — M54.16 RADICULOPATHY, LUMBAR REGION: ICD-10-CM

## 2025-04-11 PROCEDURE — 99214 OFFICE O/P EST MOD 30 MIN: CPT | Performed by: ANESTHESIOLOGY

## 2025-04-11 RX ORDER — FLUTICASONE PROPIONATE 50 MCG
2 SPRAY, SUSPENSION (ML) NASAL DAILY
COMMUNITY

## 2025-04-11 RX ORDER — BENZONATATE 100 MG/1
CAPSULE ORAL
COMMUNITY

## 2025-04-11 RX ORDER — LEMBOREXANT 5 MG/1
1 TABLET, FILM COATED ORAL DAILY
COMMUNITY

## 2025-04-11 RX ORDER — AMLODIPINE BESYLATE 10 MG/1
TABLET ORAL
COMMUNITY
Start: 2024-12-09

## 2025-04-11 RX ORDER — HYDROCODONE BITARTRATE AND ACETAMINOPHEN 10; 325 MG/1; MG/1
1 TABLET ORAL 4 TIMES DAILY PRN
Qty: 120 TABLET | Refills: 0 | Status: SHIPPED | OUTPATIENT
Start: 2025-04-11 | End: 2025-05-11

## 2025-04-11 RX ORDER — ROSUVASTATIN CALCIUM 20 MG/1
TABLET, COATED ORAL
COMMUNITY
Start: 2025-01-31

## 2025-04-11 RX ORDER — HYDROCODONE BITARTRATE AND ACETAMINOPHEN 10; 325 MG/1; MG/1
1 TABLET ORAL 4 TIMES DAILY PRN
Qty: 120 TABLET | Refills: 0 | Status: SHIPPED | OUTPATIENT
Start: 2025-05-11 | End: 2025-06-10

## 2025-04-11 RX ORDER — PANTOPRAZOLE SODIUM 40 MG/1
1 TABLET, DELAYED RELEASE ORAL
COMMUNITY
Start: 2024-11-19

## 2025-04-11 RX ORDER — FAMOTIDINE 40 MG/1
1 TABLET, FILM COATED ORAL NIGHTLY
COMMUNITY
Start: 2024-08-25

## 2025-04-11 RX ORDER — MELOXICAM 15 MG/1
1 TABLET ORAL DAILY
COMMUNITY
Start: 2024-08-22

## 2025-04-11 RX ORDER — PREGABALIN 150 MG/1
150 CAPSULE ORAL 3 TIMES DAILY
Qty: 90 CAPSULE | Refills: 2 | Status: SHIPPED | OUTPATIENT
Start: 2025-04-11 | End: 2025-07-10

## 2025-04-11 RX ORDER — FLUCONAZOLE 150 MG/1
TABLET ORAL
COMMUNITY

## 2025-04-11 RX ORDER — HYDROCODONE BITARTRATE AND ACETAMINOPHEN 10; 325 MG/1; MG/1
1 TABLET ORAL 4 TIMES DAILY PRN
Qty: 120 TABLET | Refills: 0 | Status: SHIPPED | OUTPATIENT
Start: 2025-06-10 | End: 2025-07-10

## 2025-04-11 RX ORDER — LISINOPRIL 5 MG/1
1 TABLET ORAL DAILY
COMMUNITY
Start: 2024-12-09

## 2025-04-11 RX ORDER — EMPAGLIFLOZIN 25 MG/1
TABLET, FILM COATED ORAL
COMMUNITY
Start: 2024-12-09

## 2025-04-11 RX ORDER — PREGABALIN 150 MG/1
CAPSULE ORAL
COMMUNITY
Start: 2024-12-09

## 2025-04-11 RX ORDER — HYDROXYZINE HYDROCHLORIDE 25 MG/1
1 TABLET, FILM COATED ORAL EVERY 8 HOURS PRN
COMMUNITY

## 2025-04-11 RX ORDER — CARVEDILOL 6.25 MG/1
1 TABLET ORAL 2 TIMES DAILY
COMMUNITY
Start: 2024-12-09

## 2025-04-11 RX ORDER — TIRZEPATIDE 7.5 MG/.5ML
7.5 INJECTION, SOLUTION SUBCUTANEOUS WEEKLY
COMMUNITY
Start: 2025-04-04

## 2025-04-11 RX ORDER — HYDROCODONE BITARTRATE AND ACETAMINOPHEN 10; 325 MG/1; MG/1
1 TABLET ORAL 4 TIMES DAILY PRN
COMMUNITY
Start: 2025-01-08

## 2025-04-11 RX ORDER — ASPIRIN 81 MG/1
2 TABLET ORAL DAILY
COMMUNITY

## 2025-04-11 RX ORDER — LORATADINE 10 MG/1
TABLET ORAL
COMMUNITY
Start: 2024-12-09

## 2025-04-11 ASSESSMENT — ENCOUNTER SYMPTOMS
ABDOMINAL PAIN: 0
BACK PAIN: 1
SHORTNESS OF BREATH: 0

## 2025-04-18 ENCOUNTER — HOSPITAL ENCOUNTER (EMERGENCY)
Age: 60
Discharge: HOME | End: 2025-04-18
Attending: EMERGENCY MEDICINE
Payer: MEDICARE

## 2025-04-18 VITALS
SYSTOLIC BLOOD PRESSURE: 158 MMHG | RESPIRATION RATE: 16 BRPM | TEMPERATURE: 100 F | HEIGHT: 65 IN | WEIGHT: 220 LBS | OXYGEN SATURATION: 93 % | BODY MASS INDEX: 36.65 KG/M2 | HEART RATE: 93 BPM | DIASTOLIC BLOOD PRESSURE: 78 MMHG

## 2025-04-18 DIAGNOSIS — U07.1 COVID-19: Primary | ICD-10-CM

## 2025-04-18 LAB
FLUAV RNA SPEC QL NAA+PROBE: NOT DETECTED
FLUBV RNA SPEC QL NAA+PROBE: NOT DETECTED
GLUCOSE BLD STRIP.AUTO-MCNC: 112 MG/DL (ref 65–100)
SARS-COV-2 RDRP RESP QL NAA+PROBE: DETECTED
SERVICE CMNT-IMP: ABNORMAL
SOURCE: ABNORMAL

## 2025-04-18 PROCEDURE — 87502 INFLUENZA DNA AMP PROBE: CPT

## 2025-04-18 PROCEDURE — 6360000002 HC RX W HCPCS: Performed by: EMERGENCY MEDICINE

## 2025-04-18 PROCEDURE — 82962 GLUCOSE BLOOD TEST: CPT

## 2025-04-18 PROCEDURE — 96372 THER/PROPH/DIAG INJ SC/IM: CPT

## 2025-04-18 PROCEDURE — 99284 EMERGENCY DEPT VISIT MOD MDM: CPT

## 2025-04-18 PROCEDURE — 87635 SARS-COV-2 COVID-19 AMP PRB: CPT

## 2025-04-18 RX ORDER — ALBUTEROL SULFATE 90 UG/1
2 INHALANT RESPIRATORY (INHALATION) 4 TIMES DAILY PRN
Qty: 18 G | Refills: 0 | Status: SHIPPED | OUTPATIENT
Start: 2025-04-18

## 2025-04-18 RX ORDER — KETOROLAC TROMETHAMINE 30 MG/ML
30 INJECTION, SOLUTION INTRAMUSCULAR; INTRAVENOUS
Status: COMPLETED | OUTPATIENT
Start: 2025-04-18 | End: 2025-04-18

## 2025-04-18 RX ORDER — PROMETHAZINE HYDROCHLORIDE 25 MG/1
25 TABLET ORAL EVERY 6 HOURS PRN
Qty: 20 TABLET | Refills: 0 | Status: SHIPPED | OUTPATIENT
Start: 2025-04-18 | End: 2025-04-23

## 2025-04-18 RX ORDER — BENZONATATE 200 MG/1
200 CAPSULE ORAL 3 TIMES DAILY PRN
Qty: 30 CAPSULE | Refills: 0 | Status: SHIPPED | OUTPATIENT
Start: 2025-04-18 | End: 2025-04-28

## 2025-04-18 RX ADMIN — KETOROLAC TROMETHAMINE 30 MG: 30 INJECTION, SOLUTION INTRAMUSCULAR at 22:25

## 2025-04-18 ASSESSMENT — PAIN DESCRIPTION - LOCATION
LOCATION: GENERALIZED
LOCATION: GENERALIZED

## 2025-04-18 ASSESSMENT — PAIN DESCRIPTION - DESCRIPTORS: DESCRIPTORS: ACHING

## 2025-04-18 ASSESSMENT — PAIN - FUNCTIONAL ASSESSMENT: PAIN_FUNCTIONAL_ASSESSMENT: 0-10

## 2025-04-18 ASSESSMENT — ENCOUNTER SYMPTOMS: COUGH: 1

## 2025-04-18 ASSESSMENT — PAIN SCALES - GENERAL
PAINLEVEL_OUTOF10: 10
PAINLEVEL_OUTOF10: 9

## 2025-04-19 NOTE — FLOWSHEET NOTE
Patient mobility status  with no difficulty.     I have reviewed discharge instructions with the patient.  The patient verbalized understanding.    Patient left ED via Discharge Method: ambulatory to Home with  spouse .    Opportunity for questions and clarification provided.     Patient given 4 scripts.

## 2025-04-19 NOTE — DISCHARGE INSTRUCTIONS
Take the full course of Paxlovid as prescribed.    Use the Tessalon Perles as needed for cough up to 3 times a day.    Use the rescue inhaler as needed for shortness of breath.    Take the Phenergan for nausea or half dose of Phenergan at night to help suppress the cough.    Monitor your oxygen saturation with a pulse oximeter and if your oxygen saturation is below 90% and not coming up return to the emergency department immediately.    Take Tylenol or Motrin as needed for fevers and body aches.

## 2025-04-19 NOTE — ED PROVIDER NOTES
Emergency Department Provider Note       PCP: Hilda Schultz, JACLYN - IMMANUEL   Age: 59 y.o.   Sex: female     DISPOSITION Decision To Discharge 04/18/2025 10:19:47 PM    ICD-10-CM    1. COVID-19  U07.1           Medical Decision Making     DDX:    Viral infection, croup (bronchiolitis), mononucleosis, COVID-19, influenza    Acute sinusitis, chronic sinusitis,    OM, serous OM, otitis externa,    Pharyngitis, Strep Throat, adenitis, uvulitis, rhinitis, postnasal drainage, nasal congestion    Bronchitis, pneumonia…    ED Course as of 04/18/25 2245 Fri Apr 18, 2025 2203 SARS-CoV-2, Rapid(!): Detected [KH]      ED Course User Index  [KH] Tello Haji,      1 acute illness with systemic symptoms.  Prescription drug management performed.  Shared medical decision making was utilized in creating the patients health plan today.  I independently ordered and reviewed each unique test.                         History     Patient is a 59-year-old female presenting to the emergency department today complaining of sore throat starting yesterday but then today she started getting diffuse bodyaches and headache.  The patient says that her brother has been sick with the sniffles around her recently.  She denies any fevers or chills.  Patient says that she has had a nonproductive cough but no shortness of breath          ROS     Review of Systems   Constitutional:  Positive for activity change and appetite change.   Respiratory:  Positive for cough.    Musculoskeletal:  Positive for myalgias.   Neurological:  Positive for headaches.        Physical Exam     Vitals signs and nursing note reviewed:  Vitals:    04/18/25 2031   BP: 136/87   Pulse: (!) 105   Resp: 16   Temp: 100 °F (37.8 °C)   TempSrc: Oral   SpO2: 96%   Weight: 99.8 kg (220 lb)   Height: 1.651 m (5' 5\")      Physical Exam     GENERAL:The patient has Body mass index is 36.61 kg/m². Well-hydrated.  No acute distress.  VITAL SIGNS: Heart rate, blood pressure,

## 2025-04-19 NOTE — ED TRIAGE NOTES
Patient states headache, chills, body aches, and sugar may be way up but doesn't know because can't fine glucose monitor.  Patient has been incontinent of urine due to frequency.   in triage.  Was exposed to brother who has been sick w/cold symptoms.

## 2025-05-08 ENCOUNTER — OFFICE VISIT (OUTPATIENT)
Age: 60
End: 2025-05-08

## 2025-05-08 DIAGNOSIS — M70.61 GREATER TROCHANTERIC BURSITIS OF RIGHT HIP: Primary | ICD-10-CM

## 2025-05-08 RX ORDER — BETAMETHASONE SODIUM PHOSPHATE AND BETAMETHASONE ACETATE 3; 3 MG/ML; MG/ML
30 INJECTION, SUSPENSION INTRA-ARTICULAR; INTRALESIONAL; INTRAMUSCULAR; SOFT TISSUE ONCE
Status: COMPLETED | OUTPATIENT
Start: 2025-05-08 | End: 2025-05-08

## 2025-05-08 NOTE — PROGRESS NOTES
Greater Trochanteric Bursa Injection Procedure Note  NAME: Federico Ellsworth  ID: [id number]  : 1965  DOS: 2025  Location: [address] [facility]    Procedure: Right greater trochanteric bursa injection    Pre-op Diagnosis: Greater trochanteric bursitis    Post-op Diagnosis: Same    Consent: Informed consent was obtained and potential risks discussed including, but not limited to bleeding, bruising, severe allergic reactions to components of the injection materials, infection, nerve damage, no benefit from the procedure, or, in rare instances, worsening of the pain.  Questions were answered to the patient's satisfaction and the patient wished to proceed. Alternative options for treatment were discussed previously with the patient.     Anesthesia: None    Medications Injected: 6 mg of betamethasone and 2 mL of 0.25% bupivacaine at each target    Estimated Blood Loss: None    Complications: None    Technique:  A timeout was performed and the correct location was confirmed with the patient.  The area to be injected was sterilely prepped with chlorhexidine and sterilely draped.     With the patient in the prone position, the patient was prepped and draped in the usual sterile fashion using chlorhexidine and a sterile drape. A 25-gauge, 5-inch Quincke needle was introduced under intermittent fluoroscopy until the tip reached the lateral greater trochanter. The needle was then withdrawn 1-3 mm and the above injectate was given slowly, after which the needle was removed intact.     The procedure was completed without complications and was tolerated well. The patient was monitored after the procedure for an appropriate period of time and neurological evaluation was unchanged. The patient (or responsible party) was given post-procedure and discharge instructions to follow at home. The patient was discharged in stable condition.     Pre-procedure Pain Score:   7/10  Post-procedure Pain Score: 6/10    Notes: Uncomplicated

## 2025-06-30 ENCOUNTER — OFFICE VISIT (OUTPATIENT)
Age: 60
End: 2025-06-30
Payer: MEDICARE

## 2025-06-30 DIAGNOSIS — M47.816 FACET ARTHROPATHY, LUMBAR: ICD-10-CM

## 2025-06-30 DIAGNOSIS — M54.2 CHRONIC NECK PAIN: ICD-10-CM

## 2025-06-30 DIAGNOSIS — M53.3 CHRONIC SI JOINT PAIN: ICD-10-CM

## 2025-06-30 DIAGNOSIS — G89.29 CHRONIC NECK PAIN: ICD-10-CM

## 2025-06-30 DIAGNOSIS — M54.16 RADICULOPATHY, LUMBAR REGION: ICD-10-CM

## 2025-06-30 DIAGNOSIS — G89.29 CHRONIC BILATERAL LOW BACK PAIN WITH RIGHT-SIDED SCIATICA: ICD-10-CM

## 2025-06-30 DIAGNOSIS — M70.61 GREATER TROCHANTERIC BURSITIS OF RIGHT HIP: ICD-10-CM

## 2025-06-30 DIAGNOSIS — M54.41 CHRONIC BILATERAL LOW BACK PAIN WITH RIGHT-SIDED SCIATICA: ICD-10-CM

## 2025-06-30 DIAGNOSIS — S81.801S LEG WOUND, RIGHT, SEQUELA: ICD-10-CM

## 2025-06-30 DIAGNOSIS — G89.29 CHRONIC SI JOINT PAIN: ICD-10-CM

## 2025-06-30 PROCEDURE — 99214 OFFICE O/P EST MOD 30 MIN: CPT | Performed by: ANESTHESIOLOGY

## 2025-06-30 RX ORDER — HYDROCODONE BITARTRATE AND ACETAMINOPHEN 10; 325 MG/1; MG/1
1 TABLET ORAL 4 TIMES DAILY PRN
Qty: 120 TABLET | Refills: 0 | Status: SHIPPED | OUTPATIENT
Start: 2025-09-08 | End: 2025-10-08

## 2025-06-30 RX ORDER — PREGABALIN 150 MG/1
150 CAPSULE ORAL 3 TIMES DAILY
Qty: 90 CAPSULE | Refills: 2 | Status: SHIPPED | OUTPATIENT
Start: 2025-06-30 | End: 2025-09-28

## 2025-06-30 RX ORDER — HYDROCODONE BITARTRATE AND ACETAMINOPHEN 10; 325 MG/1; MG/1
1 TABLET ORAL 4 TIMES DAILY PRN
Qty: 120 TABLET | Refills: 0 | Status: SHIPPED | OUTPATIENT
Start: 2025-07-10 | End: 2025-08-09

## 2025-06-30 RX ORDER — HYDROCODONE BITARTRATE AND ACETAMINOPHEN 10; 325 MG/1; MG/1
1 TABLET ORAL 4 TIMES DAILY PRN
Qty: 120 TABLET | Refills: 0 | Status: SHIPPED | OUTPATIENT
Start: 2025-08-09 | End: 2025-09-08

## 2025-06-30 ASSESSMENT — ENCOUNTER SYMPTOMS
BACK PAIN: 1
ABDOMINAL PAIN: 0
SHORTNESS OF BREATH: 0
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
## (undated) DEVICE — SYR 3ML LL TIP 1/10ML GRAD --

## (undated) DEVICE — CANNULA NSL ORAL AD FOR CAPNOFLEX CO2 O2 AIRLFE

## (undated) DEVICE — CONNECTOR TBNG OD5-7MM O2 END DISP

## (undated) DEVICE — NDL PRT INJ NSAF BLNT 18GX1.5 --

## (undated) DEVICE — KENDALL RADIOLUCENT FOAM MONITORING ELECTRODE RECTANGULAR SHAPE: Brand: KENDALL

## (undated) DEVICE — FCPS BX HOT RJ4 2.2MMX240CM -- RADIAL JAW 4 BX/40

## (undated) DEVICE — SYR 5ML 1/5 GRAD LL NSAF LF --

## (undated) DEVICE — REM POLYHESIVE ADULT PATIENT RETURN ELECTRODE: Brand: VALLEYLAB

## (undated) DEVICE — CONTAINER PREFIL FRMLN 40ML --